# Patient Record
Sex: MALE | Race: WHITE
[De-identification: names, ages, dates, MRNs, and addresses within clinical notes are randomized per-mention and may not be internally consistent; named-entity substitution may affect disease eponyms.]

---

## 2018-05-06 ENCOUNTER — HOSPITAL ENCOUNTER (EMERGENCY)
Dept: HOSPITAL 62 - ER | Age: 43
Discharge: HOME | End: 2018-05-06
Payer: SELF-PAY

## 2018-05-06 VITALS — DIASTOLIC BLOOD PRESSURE: 86 MMHG | SYSTOLIC BLOOD PRESSURE: 129 MMHG

## 2018-05-06 DIAGNOSIS — S43.422A: Primary | ICD-10-CM

## 2018-05-06 DIAGNOSIS — K02.9: ICD-10-CM

## 2018-05-06 DIAGNOSIS — F17.200: ICD-10-CM
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
## 2018-05-06 PROCEDURE — 99283 EMERGENCY DEPT VISIT LOW MDM: CPT

## 2018-05-06 NOTE — ER DOCUMENT REPORT
HPI





- HPI


Patient complains to provider of: abscessed tooth, left shoulder pain


Onset: Other - shoulder yesterday-tooth for awhile


Quality of pain: Throbbing


Pain Level: 4


Context: 





41 yo male c/o left shoulder pain, throbbing since moving furniture yesterday, 

felt a pop while lifting a couch Also needs antibiotics for toothach since 

friday morning[facility]


Associated Symptoms: None


Exacerbated by: Movement


Relieved by: Denies


Similar symptoms previously: Yes - teeth


Recently seen / treated by doctor: No





- ROS


ROS below otherwise negative: Yes


Systems Reviewed and Negative: Yes All other systems reviewed and negative





Past Medical History





- General


Information source: Patient





- Social History


Smoking Status: Current Every Day Smoker


Frequency of alcohol use: Occasional


Drug Abuse: None


Occupation: [employer]


Lives with: Spouse/Significant other


Family History: Reviewed & Not Pertinent





- Medical History


Medical History: Negative


Infectious Medical History: Denies: Hx MRSA, Hx VRE


Past Surgical History: Reports: Hx Genitourinary Surgery - vasectomy





Vertical Provider Document





- CONSTITUTIONAL


Agree With Documented VS: Yes


Exam Limitations: No Limitations


General Appearance: No Apparent Distress





- INFECTION CONTROL


TRAVEL OUTSIDE OF THE [place] IN LAST 30 DAYS: No





- HEENT


HEENT: Normocephalic


Notes: 





lower right decay molar with tender gingiva, no abscess  





- NECK


Neck: Supple





- RESPIRATORY


Respiratory: Breath Sounds Normal, No Respiratory Distress





- CARDIOVASCULAR


Cardiovascular: Regular Rate, Regular Rhythm





- MUSCULOSKELETAL/EXTREMETIES


Musculoskeletal/Extremeties: MAEW, Tender - left shoulder superior joint, 

increased pain with abduction. pt holding it flexed and adducted


Notes: 





2+ radial pulse. color normal.





- NEURO


Level of Consciousness: Awake


Motor/Sensory: No Motor Deficit, No Sensory Deficit





- DERM


Integumentary: No Rash





Course





- Re-evaluation


Re-evalutation: 





05/06/18 18:23


pt has not had his xray yet.


05/06/18 18:39


xray neg per rad





Discharge





- Discharge


Clinical Impression: 


 dental pain and decay





Rotator cuff (capsule) sprain


Qualifiers:


 Encounter type: initial encounter Laterality: left Qualified Code(s): S43.422A 

- Sprain of left rotator cuff capsule, initial encounter





Condition: Good


Disposition: HOME, SELF-CARE


Instructions:  Acetaminophen, Dentist, Ibuprofen (General) ([facility]), Muscle Strain 

([facility]), Rotator Cuff Injury ([facility]), Toothache ([facility])


Additional Instructions: 


see the dentist


antibiotics as prescribed


orthopedic referral if shoulder pain persists


to er any conerns


Prescriptions: 


Ibuprofen [Motrin 800 mg Tablet] 800 mg PO Q8HP PRN #30 tablet


 PRN Reason: 


Penicillin V Potassium [Penicillin Vk 500 mg Tablet] 500 mg PO QID #40 tablet


Forms:  Return to Work


Referrals: 


JUAN ANTONIO QUINTANA MD [ACTIVE STAFF] - Follow up as needed

## 2018-05-06 NOTE — RADIOLOGY REPORT (SQ)
EXAM DESCRIPTION:  SHOULDER LEFT 2 OR MORE VIEWS



COMPLETED DATE/TIME:  5/6/2018 6:25 pm



REASON FOR STUDY:  left shoulder injury



COMPARISON:  None.



NUMBER OF VIEWS:  Three views left shoulder.



LIMITATIONS:  None.



FINDINGS:  There is no acute or significant bone, joint or soft tissue abnormality.

OTHER: No other significant finding.



IMPRESSION:  NORMAL STUDY.



TECHNICAL DOCUMENTATION:  JOB ID:  [id number]



Reading location - IP[facility]workstation [facility]: [email or web address]

## 2018-11-07 ENCOUNTER — HOSPITAL ENCOUNTER (EMERGENCY)
Dept: HOSPITAL 62 - ER | Age: 43
Discharge: HOME | End: 2018-11-07
Payer: OTHER GOVERNMENT

## 2018-11-07 VITALS — DIASTOLIC BLOOD PRESSURE: 73 MMHG | SYSTOLIC BLOOD PRESSURE: 123 MMHG

## 2018-11-07 DIAGNOSIS — Z87.828: ICD-10-CM

## 2018-11-07 DIAGNOSIS — Z79.899: ICD-10-CM

## 2018-11-07 DIAGNOSIS — R13.10: ICD-10-CM

## 2018-11-07 DIAGNOSIS — M54.2: ICD-10-CM

## 2018-11-07 DIAGNOSIS — M25.511: Primary | ICD-10-CM

## 2018-11-07 DIAGNOSIS — M62.838: ICD-10-CM

## 2018-11-07 DIAGNOSIS — R12: ICD-10-CM

## 2018-11-07 DIAGNOSIS — F17.210: ICD-10-CM

## 2018-11-07 DIAGNOSIS — Z79.1: ICD-10-CM

## 2018-11-07 PROCEDURE — 99283 EMERGENCY DEPT VISIT LOW MDM: CPT

## 2018-11-07 NOTE — ER DOCUMENT REPORT
ED Extremity Problem, Upper





- General


Chief Complaint: Shoulder Pain


Stated Complaint: SHOULDER/BACK PAIN


Time Seen by Provider: 11/07/18 16:47


Mode of Arrival: Ambulatory


Information source: Patient


Notes: 





Patient is a 43-year-old male presenting to the emergency department for a one-

month history of worsening right shoulder pain.  The pain first began while he 

was using a double machete to cut down foliage in his yard.  The pain was 

initially mild with some muscular cramping around his shoulder, the cramping 

and tightness then began to spread across the trapezius muscle and eventually 

reached his left trapezius muscle as well.  He has noted some paraspinous 

tightness in his neck as well.  He has a history of muscle strain to his right 

shoulder, which occurred about 6 months ago.  He has taken 800 mg of ibuprofen 

QID for the past 20 years.  He also takes Nexium every other day for heartburn.

  [facility]


Patient was in no acute distress, resting comfortably in exam room.  He 

ambulated with a steady gait and respirations were even and unlabored.


TRAVEL OUTSIDE OF THE [place] IN LAST 30 DAYS: No





- HPI


Patient complains to provider of: Injury, Pain, Shoulder - right sided


Onset: Other - 1 mo. ago


Recent injury: No - injury 1 month ago and 6 months ago


Where: Outdoors


Quality of pain: Achy, Cramping


Severity of pain: Moderate


Pain Level: 2


Context: Other - While weed whacking with a machete


Associated symptoms: Neck pain - Paraspinous neck pain


Exacerbated by: Movement


Relieved by: Rest


Similar symptoms previously: Yes - Previous injury 6 months ago did not cause 

same degree of pain


Recently seen / treated by doctor: No





- Related Data


Allergies/Adverse Reactions: 


 





No Known Allergies Allergy (Verified 11/07/18 15:45)


 








Home Medications: Ibuprofen, Nexium





Past Medical History





- General


Information source: Patient





- Social History


Smoking Status: Current Every Day Smoker


Cigarette use (# per day): Yes - 10


Chew tobacco use (# tins/day): No


Smoking Education Provided: Yes


Occupation: disability


Lives with: Alone


Family History: Reviewed & Not Pertinent


Patient has homicidal ideation: No





- Past Medical History


Cardiac Medical History: Reports: None


Pulmonary Medical History: Reports: None


EENT Medical History: Reports: None


Neurological Medical History: Reports: None


Endocrine Medical History: Reports: None


Renal/ Medical History: Reports: None


Malignancy Medical History: Reports None


GI Medical History: Reports: Hx Gastroesophageal Reflux Disease


Musculoskeletal Medical History: Reports Hx Muscle Spasm - as noted in HPI, 

Reports Hx Musculoskeletal Trauma - as noted in HPI


Skin Medical History: Reports None


Psychiatric Medical History: Reports: None


Traumatic Medical History: Reports: None


Past Surgical History: Reports: Hx Genitourinary Surgery - vasectomy





Review of Systems





- Review of Systems


Notes: 


REVIEW OF SYSTEMS:


CONSTITUTIONAL :  Denies fever,  chills, or sweats.  Denies recent illness.


EENT:   Positive for difficulty swallowing, with muscle spasms that radiate 

laterally. denies eye, ear, throat, or mouth pain or symptoms.  Denies nasal or 

sinus congestion or discharge.  Denies throat, tongue, or mouth swelling.


CARDIOVASCULAR:  Denies chest pain.  Denies palpitations or racing or irregular 

heart beat.  Denies ankle edema.


RESPIRATORY:  Denies cough, cold, or chest congestion.  Denies shortness of 

breath, difficulty breathing, or wheezing.


GASTROINTESTINAL:  Denies abdominal pain or distention.  Denies nausea, vomiting

, or diarrhea.  Denies blood in vomitus, stools, or per rectum.  Denies black, 

tarry stools.  Denies constipation.  


GENITOURINARY:  Denies difficulty urinating, painful urination, burning, 

frequency, blood in urine, or discharge.


MUSCULOSKELETAL:  Denies back or neck pain or stiffness.  See HPI


SKIN:   Denies rash, lesions or sores.


HEMATOLOGIC :   Denies easy bruising or bleeding.


LYMPHATIC:  Denies swollen, enlarged glands.


NEUROLOGICAL:  Denies confusion or altered mental status.  Denies passing out 

or loss of consciousness.  Denies dizziness or lightheadedness.  Denies 

headache.  Denies weakness or paralysis or loss of use of either side.  Denies 

problems with gait or speech.  Denies sensory loss, numbness, or tingling.  

Denies seizures.


PSYCHIATRIC:  Denies anxiety or stress.  Denies depression, suicidal ideation, 

or homicidal ideation.





ALL OTHER SYSTEMS REVIEWED AND NEGATIVE.





Dictation was performed using Dragon voice recognition software 





PHYSICAL EXAMINATION:





GENERAL: Well-appearing, well-nourished and in no acute distress.





HEAD: Atraumatic, normocephalic.





EYES: Pupils equal round and reactive to light, extraocular movements intact, 

sclera anicteric, conjunctiva are normal.





ENT: Nares patent, oropharynx clear without exudates.  Moist mucous membranes.





NECK: Normal range of motion, supple without lymphadenopathy





LUNGS: Breath sounds clear to auscultation bilaterally and equal.  No wheezes 

rales or rhonchi.





HEART: Regular rate and rhythm without murmurs





ABDOMEN: Soft, nontender, nondistended abdomen.  No guarding, no rebound.  No 

masses appreciated.





Musculoskeletal: Full active range of motion intact in both shoulders 

bilaterally.  





Right shoulder exam revealed: No obvious joints or bony deformities.  No 

abrasions, erythema noted.  Tenderness to palpation at the glenohumeral space.  

No tenderness to palpation along clavicle.  Tenderness along supraspinous 

musculature of scapula and along anterior origin of pectoral muscle.  

Tenderness noted along trapezius muscles bilaterally. Negative Neer's, negative 

drop arm test.  Full active range of motion intact.





Left shoulder exam revealed: No tenderness to palpation, no deformities, full 

range of motion intact.





Paraspinous cervical muscles tender to palpation.  No cervical spinous process 

tenderness.








NEUROLOGICAL: Cranial nerves grossly intact.  Normal speech, normal gait.  

Normal sensory, motor exams 





PSYCH: Normal mood, normal affect.





SKIN: Warm, Dry, normal turgor, no rashes or lesions noted.





Physical Exam





- Vital signs


Vitals: 


 











Temp Pulse Resp BP Pulse Ox


 


 98.4 F   71   16   136/82 H  95 


 


 11/07/18 15:51  11/07/18 15:51  11/07/18 15:51  11/07/18 15:51  11/07/18 15:51














Course





- Vital Signs


Vital signs: 


 











Temp Pulse Resp BP Pulse Ox


 


 97.9 F   52 L  13   123/73   97 


 


 11/07/18 18:09  11/07/18 18:09  11/07/18 18:09  11/07/18 18:09  11/07/18 18:09














- Diagnostic Test


Radiology reviewed: Image reviewed, Reports reviewed





Discharge





- Discharge


Clinical Impression: 


Right shoulder pain


Qualifiers:


 Chronicity: unspecified Qualified Code(s): M25.511 - Pain in right shoulder





Condition: Stable


Disposition: HOME, SELF-CARE


Additional Instructions: 


You were seen today for acute on chronic right shoulder pain.





Your x-ray is negative for any acute bony injury.





I have given you a written report of the x-ray to follow-up with orthopedics 

and your primary doctor.





Shoulder Injury





     You have injured your shoulder.  This usually results from stretching or 

tearing of the tendons during trauma.  Time and protection are required in 

order to heal properly.  Many injuries are quite disabling, and should be taken 

seriously.


     Initial treatment includes cold packs and a sling to rest the shoulder.  

The physician has assessed the seriousness of your injury, and has outlined a 

treatment plan.  Understand that this treatment may change, depending on how 

you progress.


     If a re-examination was recommended, it is important that you follow up as 

instructed.  Some shoulder injuries (such as partial tear of the rotator cuff) 

are only suspected after you've failed to improve.


Call us if there's severe pain, numbness, or loss of function.





Exercise Program for the Shoulder





     Since the shoulder moves in so many directions, the joint attachment is 

weak.  Muscles provide most of the stability to the shoulder.  You must 

exercise your shoulder to prevent painful instability or stiffening.


     PASSIVE - These may be begun within a few days of the injury. While 

standing, lean forward, allowing the arm to hang down towards the floor.  Move 

the arm in small circles while slowly twisting your chest towards and away from 

the hanging arm.  Do this for one minute.


     ACTIVE - These may be performed when the doctor gives permission. Begin 

with the arms at the sides.  Raise the arms forward (shoulder's width apart) 

until they reach shoulder level.  Then slowly swing both arms back until they 

are aiming straight out away from each other. Then bring them forward again, 

and finally, lower them to your sides. Repeat 20 to 30 times.  As you improve, 

put weights in your hands for the exercise.  Start with one pound, and work up 

to 10 pounds.  Never use more than is comfortable.  Athletes may work up to 30 

pounds.








USE OF TYLENOL (ACETAMINOPHEN):


     Acetaminophen may be taken for pain relief or fever control. It's much 

safer than aspirin, offering a wider range of "safe" dosages.  It is safe 

during pregnancy.  Some brand names are Tylenol, Panadol, Datril, Anacin 3, 

Tempra, and Liquiprin. Acetaminophen can be repeated every four hours.  The 

following are maximum recommended dosages:





WEIGHT         Dose             Drops                  Elixir        Chewable(

80mg)


(LBS.)                            drprs=droppers    tsp=teaspoon


6               40 mg            0.4 ml (1/2)


6-11            80 mg            0.8 ml (full)              tsp               

   1       tab


12-16         120 mg           1 1/2 drprs             3/4  tsp               1 

1/2  tabs


17-23         160 mg             2  drprs             1    tsp                 

  2       tabs


24-30         240 mg             3  drprs             1 1/2 tsp                

3       tabs


30-35         320 mg                                       2    tsp            

       4       tabs


36-41         360 mg                                       2 1/4   tsp         

     4 1/2 tabs


42-47         400 mg                                       2 1/2   tsp         

     5      tabs


48-53         480 mg                                       3    tsp            

       6      tabs


54-59         520 mg                                       3  1/4  tsp         

     6 1/2 tabs


60-64         560 mg                                       3  1/2  tsp         

     7      tabs 


65-70         600 mg                                       3  3/4  tsp         

     7 1/2 tabs


71-76         640 mg                                       4   tsp             

      8      tabs


77-82         720 mg                                       4 1/2   tsp         

    9      tabs


83-88         800 mg                                       5   tsp             

    10      tabs





>89 pounds or adults          650 mg to 900 mg





Acetaminophen can be repeated every four hours.  Maximum dose not to exceed 

4000 mg a day.





   These maximum recommended dosages are slightly higher than the dosages 

written on the product container, but these dosages are very safe and below the 

toxic dosage for acetaminophen.








ICE PACKS:


     Apply ice packs frequently against the painful area.  Many different 

schedules are recommended, such as "20 minutes on, 20 minutes off" or "one hour 

ice, two hours rest."  If you need to work, you may need to go longer between 

ice treatments.  You should plan to have the area ice packed AT LEAST one 

fourth of the time.


     The ice should be applied over the wrap, tape, or splint, or over a layer 

of cloth -- not directly against the skin.  Some ice bags have a built-in cloth 

and can be put directly on the skin.





WARM PACKS:


     After approximately two days, apply gentle heat (such as a heating pad or 

hot water bottle) for about 20 to 30 minutes about every two hours -- at least 

four times daily.  Warmth and elevation will help you make a more rapid recovery

, and will ease the pain considerably.


     Do not use HOT heat, and never apply heat for longer than 30 minutes.  The 

continuous heat can invisibly damage skin and muscles -- even when no burn is 

seen on the surface.  Damaged muscles can make you MORE sore.








MUSCLE RELAXERS: 


     Muscle relaxing medications are usually prescribed for acute muscle spasm 

or injury to the neck and back.  They are often combined with antiinflammatory 

pain medication for increased relief.


     You may stop the muscle relaxer when the pain and stiffness have improved.

  Start the medication again if spasms recur.  


     Muscle relaxers may cause drowsiness, especially with the first dose.  Do 

not operate machinery or drive while under the effects of the medication.  Most 

muscle relaxers last up to 24 hours.  Do not combine the medication with 

alcohol.











FOLLOW-UP CARE:


If you have been referred to a physician for follow-up care, call the physician

s office for an appointment as you were instructed or within the next two days.

  If you experience worsening or a significant change in your symptoms, notify 

the physician immediately or return to the Emergency Department at any time for 

re-evaluation.


Prescriptions: 


Baclofen 5 mg PO TIDP PRN #15 tablet


 PRN Reason: 


Ibuprofen [Ibu] 800 mg PO BIDP PRN #14 tablet


 PRN Reason: 


Forms:  Elevated Blood Pressure, Smoking Cessation Education, Return to Work


Referrals: 


PHYLLIS SALAZAR MD [ACTIVE STAFF] - Follow up as needed

## 2018-11-07 NOTE — RADIOLOGY REPORT (SQ)
EXAM DESCRIPTION:  SHOULDER RIGHT 2 OR MORE VIEWS



COMPLETED DATE/TIME:  11/7/2018 5:14 pm



REASON FOR STUDY:  pain increasing



COMPARISON:  None.



NUMBER OF VIEWS:  Three views.



TECHNIQUE:  Internal rotation, external rotation, and Y view images acquired of the right shoulder.



LIMITATIONS:  None.



FINDINGS:  MINERALIZATION: Normal.

BONES: No acute fracture or dislocation.  No worrisome bone lesions.

JOINTS: No dislocation.

VISUALIZED LUNGS AND RIBS: No pneumothorax.  No rib fracture.

SOFT TISSUES: No radiopaque foreign body.

OTHER: No other significant finding.



IMPRESSION:  NEGATIVE STUDY OF THE RIGHT SHOULDER. NO RADIOGRAPHIC EVIDENCE OF ACUTE INJURY.



TECHNICAL DOCUMENTATION:  JOB ID:  [id number]

 2011 [employer]- All Rights Reserved



Reading location - IP/workstation name: [email or web address]

## 2018-12-26 ENCOUNTER — HOSPITAL ENCOUNTER (EMERGENCY)
Dept: HOSPITAL 62 - ER | Age: 43
Discharge: HOME | End: 2018-12-26
Payer: OTHER GOVERNMENT

## 2018-12-26 VITALS — DIASTOLIC BLOOD PRESSURE: 88 MMHG | SYSTOLIC BLOOD PRESSURE: 123 MMHG

## 2018-12-26 DIAGNOSIS — M25.551: ICD-10-CM

## 2018-12-26 DIAGNOSIS — M54.31: Primary | ICD-10-CM

## 2018-12-26 DIAGNOSIS — M54.5: ICD-10-CM

## 2018-12-26 PROCEDURE — 96372 THER/PROPH/DIAG INJ SC/IM: CPT

## 2018-12-26 PROCEDURE — 73502 X-RAY EXAM HIP UNI 2-3 VIEWS: CPT

## 2018-12-26 PROCEDURE — 99283 EMERGENCY DEPT VISIT LOW MDM: CPT

## 2018-12-26 PROCEDURE — 72110 X-RAY EXAM L-2 SPINE 4/>VWS: CPT

## 2018-12-26 NOTE — RADIOLOGY REPORT (SQ)
EXAM DESCRIPTION: 



XR HIP 2 OR MORE VIEWS



COMPLETED DATE/TME:  12/26/2018 20:54



CLINICAL HISTORY: 



43 years, Male, pain in right hip since Saturday. No known

injury.



COMPARISON: None



NUMBER OF VIEWS:

Two



TECHNIQUE:

One AP view of the pelvis and one coned-down view of the right

hip joint.



LIMITATIONS:

None.



FINDINGS:



Right hip joint alignment is maintained. No fracture. Pubic

symphysis and bilateral SI joints are within normal limits. No

acute finding of the left hip joint.



IMPRESSION:



Negative for acute osseous finding of the right hip joint.

 



copyright 2011 [employer]- All Rights Reserved

## 2018-12-26 NOTE — ER DOCUMENT REPORT
ED Hip Pain/Injury





- General


Chief Complaint: Hip Pain


Stated Complaint: HIP/BACK PAIN


Time Seen by Provider: 12/26/18 20:41


Mode of Arrival: Ambulatory


Information source: Patient


Notes: 





Patient is a 43-year-old male who comes emergency room complaining of right hip 

and low back pain.  Patient denies any known trauma.  He does work as a pharmacy

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  He denies knowing any time at all 

in the past week that he has felt a twinge or a pain that started in his back.  

He states that it started Saturday and has progressively gotten worse and has 

not gotten any better.  He has attempted to take Tylenol and ibuprofen without 

any relief.  He denies any loss of urine or stool.  Denies any loss of sensation

in the lower extremities.  He is just having difficult time ambulating secondary

to the discomfort and pain because it starts low back and radiates down into his

right buttocks and to the back of his leg.


TRAVEL OUTSIDE OF THE [place] IN LAST 30 DAYS: No





- HPI


Patient complains to provider of: Injury


Occurred: Other - 3 days ago


Where: Home


Onset/Duration: Sudden, Persistent, Worse


Quality of pain: Achy, Sharp, Stabbing


Severity: Severe


Pain Level: 4


Context: Cannot remember


Symptoms prior to fall: None


Symptoms since fall: Other - There was no fall


Skin Color: Normal


Skin Temperature: Warm


Pain with palpation of the pelvis: No


Associated Symptoms: None





- Related Data


Allergies/Adverse Reactions: 


                                        





No Known Allergies Allergy (Verified 12/26/18 18:56)


   











Past Medical History





- General


Information source: Patient





- Social History


Smoking Status: Never Smoker


Cigarette use (# per day): No


Chew tobacco use (# tins/day): No


Smoking Education Provided: No


Frequency of alcohol use: None


Drug Abuse: None


Lives with: Family


Family History: Reviewed & Not Pertinent


Patient has suicidal ideation: No


Patient has homicidal ideation: No


Renal/ Medical History: Denies: Hx Peritoneal Dialysis


GI Medical History: Reports: Hx Gastroesophageal Reflux Disease


Musculoskeletal Medical History: Reports Hx Muscle Spasm - as noted in HPI, 

Reports Hx Musculoskeletal Trauma - as noted in HPI


Infectious Medical History: Denies: Hx MRSA, Hx VRE


Past Surgical History: Reports: Hx Genitourinary Surgery - vasectomy





Review of Systems





- Review of Systems


Constitutional: No symptoms reported


EENT: No symptoms reported


Cardiovascular: No symptoms reported


Respiratory: No symptoms reported


Gastrointestinal: No symptoms reported


Genitourinary: No symptoms reported


Male Genitourinary: No symptoms reported


Musculoskeletal: See HPI, Back pain, Muscle pain


Skin: No symptoms reported


Hematologic/Lymphatic: No symptoms reported


Neurological/Psychological: No symptoms reported


-: Yes All other systems reviewed and negative





Physical Exam





- Vital signs


Vitals: 


                                        











Temp Pulse Resp BP Pulse Ox


 


 98.7 F   60   15   123/75   97 


 


 12/26/18 19:24  12/26/18 19:24  12/26/18 19:24  12/26/18 19:24  12/26/18 19:24











Interpretation: Normal





- Notes


Notes: 





PHYSICAL EXAMINATION:





GENERAL: Patient is a well-nourished well-developed 43-year-old male who is in 

no apparent distress at this time of physical examination but who is definitely 

in obvious pain and discomfort.  Unable to find a position of comfort secondary 

to the back pain radiating down the right leg..





HEAD: Atraumatic, normocephalic.





NECK: Normal range of motion, supple without lymphadenopathy





LUNGS: Breath sounds clear to auscultation bilaterally and equal.  No wheezes 

rales or rhonchi.





HEART: Regular rate and rhythm without murmurs





ABDOMEN: Soft, nontender, nondistended abdomen.  No guarding, no rebound.  No 

masses appreciated.





Musculoskeletal: Patient's area of concern is his low back radiating down to the

right buttocks and into the posterior right thigh.  Patient as stated denies any

known trauma.  Physical examination finds that patient has some mild tenderness 

to palpation along the lateral edge of the lower lumbar spine around L4-L5 area.

 Palpation the deep palpation seems to bring some discomfort and tenderness on 

that area.  He also has notable sciatic notch tenderness to ballottement.  And 

he has some mild tenderness to palpation along the posterior thigh.  All of this

in conjunction with what appears to be a sciatic presentation.  There is however

a area along the buttocks that appears to be a muscle spasm.  This could be 

related to a piriformis type of his presentation.  Patient's buttocks cheek is 

tight and is a deep palpation to a what feels to be spasm in that generalized 

area.  He does display good DTRs.  There is a positive straight leg raise on the

right side to 22 degrees.  Patient's vascular examination is normal with good 

pulses in the femoral artery on the right groin.  As well as the popliteal 

distally and good dorsalis pedal pulses are 2+ as well.





NEUROLOGICAL:  Normal speech, normal gait.  Normal sensory, motor exams further 

examination shows that there is not a real concern for any type of a saddle 

paresthesia.  Patient has good sensation on the inner portions of the thighs all

the way down to the ankles and on the outer portions of the thighs all the way 

down to the ankles anterior and posterior portions all the way down to the 

ankles.  He also has range of motion in all those extremities.  Also do not 

believe there is any type of a cauda equina presentation either.  He has good 

strength against resistance in the lower extremities which is noted.





PSYCH: Normal mood, normal affect.





SKIN: Warm, Dry, normal turgor, no rashes or lesions noted.





Course





- Re-evaluation


Re-evalutation: 





12/26/18 22:00


Patient's course of stay has not been significant as in findings.  His x-ray of 

his back and hip were negative.  I believe that he has a sciatic presentation or

i.e. a piriformis syndrome.  I did look patient up on the [facility] 

[employer] drug line and he has no history of narcotic seeking.  He works as a 

pharmacy tech so I would believe that if he had any history he would not be 

working as a pharmacy tech.  He does appear to be very uncomfortable he is not 

able to sit down for very long without having to get up and move around.  When 

he goes to get up he has difficulty in doing so.  So I believe the patient 

actually has a physical problem at this point.  I do not believe he has any type

of cauda equina syndrome.





- Vital Signs


Vital signs: 


                                        











Temp Pulse Resp BP Pulse Ox


 


 97.9 F   62   16   123/88 H  100 


 


 12/26/18 22:28  12/26/18 22:28  12/26/18 22:28  12/26/18 22:28  12/26/18 22:28














Discharge





- Discharge


Clinical Impression: 


 Piriformis syndrome of right side, Sciatica of right side





Condition: Stable


Disposition: HOME, SELF-CARE


Instructions:  Sciatica ([facility])


Additional Instructions: 


Sciatica





     Your symptoms suggest "sciatica."  The pain of sciatica typically radiates 

down the leg.  Numbness in the foot or calf may also occur. Sciatica is caused 

by irritation of the sciatic nerve or its branches. The irritation can be due to

a herniated disk in the spine, swelling and inflammation in the muscles 

surrounding the sciatic nerve, or direct injury of the nerve itself.


     Most cases of sciatica will resolve with medical treatment. Bed rest is 

usually recommended initially.  Surgery is only necessary when the condition 

will not improve with rest and antiinflammatory medication.  Muscle relaxers are

often given if muscle soreness is present.


     A CAT scan of the back may be performed if a herniated disk is suspected.


     Re-examination is necessary if you develop increasing numbness, localized 

weakness in the foot or ankle, or if the pain does not respond to rest.





The other condition that leads to with similar kind of presentation is called 

piriformis syndrome.  This is where the muscle runs across the buttocks and 

pinches down on the sciatic nerve causing the same kind of presentation.  That 

is why I treat with both the pain medication and anti-inflammatory i.e. steroids

and muscle relaxer.  As we discussed you need to ice down when you get home from

work and use moist heat before.  Should you have continuation of this problem or

discomfort you need to follow-up with orthopedist.  I suggest or will give you 

the name of the orthopedic on call today is Dr. Storm.  You may contact his 

office to see if he can accommodate you.





Prescriptions: 


Cyclobenzaprine HCl [Flexeril 10 mg Tablet] 10 mg PO TIDP PRN #21 tablet


 PRN Reason: 


Hydrocodone/Acetaminophen [Norco 7.5-325 mg Tablet] 1 tab PO Q6 #12 tablet


Prednisone 10 mg PO ASDIR PRN 6 Days #1 tab.ds.pk


 PRN Reason: 


Referrals: 


[facility] [Primary [facility] Provider] - Follow up as needed


JUAN ANTONIO QUINTANA MD [ACTIVE STAFF] - Follow up as needed

## 2018-12-26 NOTE — RADIOLOGY REPORT ([facility])
EXAM DESCRIPTION: 



XR LUMBAR SPINE ANTEROPOSTERIOR, LATERAL, AND OBLIQUES



COMPLETED DATE/TME:  12/26/2018 20:53



CLINICAL HISTORY:  43 years  ,Male  pain



COMPARISON:  None.



TECHNIQUE:  Five views



FINDINGS: 



Vertebral body alignment is unremarkable.

No acute fractures are identified.

Small marginal osteophytes are present.

Question L5 spondylolysis on the left.



IMPRESSION:



No acute fracture is identified.